# Patient Record
(demographics unavailable — no encounter records)

---

## 2025-04-03 NOTE — HEALTH RISK ASSESSMENT
[Excellent] : ~his/her~  mood as  excellent [No] : No [No falls in past year] : Patient reported no falls in the past year [Little interest or pleasure doing things] : 1) Little interest or pleasure doing things [Feeling down, depressed, or hopeless] : 2) Feeling down, depressed, or hopeless [0] : 2) Feeling down, depressed, or hopeless: Not at all (0) [PHQ-2 Negative - No further assessment needed] : PHQ-2 Negative - No further assessment needed [HIV test declined] : HIV test declined [Hepatitis C test declined] : Hepatitis C test declined [None] : None [With Family] : lives with family [Employed] : employed [Single] : single [Sexually Active] : sexually active [Feels Safe at Home] : Feels safe at home [Fully functional (bathing, dressing, toileting, transferring, walking, feeding)] : Fully functional (bathing, dressing, toileting, transferring, walking, feeding) [Fully functional (using the telephone, shopping, preparing meals, housekeeping, doing laundry, using] : Fully functional and needs no help or supervision to perform IADLs (using the telephone, shopping, preparing meals, housekeeping, doing laundry, using transportation, managing medications and managing finances) [Smoke Detector] : smoke detector [Carbon Monoxide Detector] : carbon monoxide detector [Safety elements used in home] : safety elements used in home [Seat Belt] :  uses seat belt [Sunscreen] : uses sunscreen [Never] : Never [de-identified] : none [de-identified] : none [de-identified] : active [de-identified] : good [facility] : 5 [Change in mental status noted] : No change in mental status noted [Language] : denies difficulty with language [High Risk Behavior] : no high risk behavior [Reports changes in hearing] : Reports no changes in hearing [Reports changes in vision] : Reports no changes in vision [Reports changes in dental health] : Reports no changes in dental health [TB Exposure] : is not being exposed to tuberculosis

## 2025-04-03 NOTE — PHYSICAL EXAM
[Normal] : normal gait, coordination grossly intact, no focal deficits and deep tendon reflexes were 2+ and symmetric [de-identified] : Left elbow lateral epicondyles tender on palpation, no deformity noted no joint swelling.

## 2025-04-03 NOTE — ASSESSMENT
[Vaccines Reviewed] : Immunizations reviewed today. Please see immunization details in the vaccine log within the immunization flowsheet.  [FreeTextEntry1] :    Annual Physical:  Counseled on dietary modification, daily regular exercise.  Increase fiber/ plant-based diet and decrease intake of meats and animal-based diet.  To routinely use seat belts. Self-testicular and skin exams advised.  Immunizations discussed and counseled. Age-appropriate screening exams discussed.  Suspect epicondylitis, advised to avoid repetitive movements, no lifting pushing or pulling, to check x-rays, NSAIDs as needed, follow-up in 2 weeks or sooner if needed.

## 2025-04-03 NOTE — PLAN
[FreeTextEntry1] : A/P:  Annual Physical:  Counseled on dietary modification, daily regular exercise.  Increase fiber/ plant-based diet and decrease intake of meats and animal-based diet.  To routinely use seat belts. Self-testicular and skin exams advised.  Immunizations discussed and counseled. Age-appropriate screening exams discussed.

## 2025-04-03 NOTE — HISTORY OF PRESENT ILLNESS
[FreeTextEntry1] : Annual Physical [de-identified] : Here to establish care, annual physical  endorses approx 1 yr onset of left shoulder / elbow pain  works in [employer] - lifting boxes, pain is intermittent, sharp at times, nonradiating.  No loss of strength or sensation in the arm. no falls or trauma

## 2025-04-17 NOTE — ASSESSMENT
[FreeTextEntry1] :  A/P: Lateral epicondylitis, x-ray results discussed, unremarkable  advised to avoid repetitive movements, no lifting pushing or pulling, NSAIDs as needed, to consult orthopedic.  Hyperlipidemia: Advised on strict dietary modification, daily regular exercise, to recheck labs and reassess in 3 months.  Vitamin D deficiency: To continue with replacement as ordered weekly, will recheck in 3 to 4 months.  Follow-up 3 months.

## 2025-04-17 NOTE — PHYSICAL EXAM
[Normal] : no rash [de-identified] : Left elbow lateral epicondyles tender on palpation, no deformity noted no joint swelling.

## 2025-04-17 NOTE — HISTORY OF PRESENT ILLNESS
[FreeTextEntry1] : f/u elbow pain / HLD [de-identified] : 31 y/o M with h/o HLD, Vit D def  endorses approx 1 yr onset of left shoulder / elbow pain  works in [employer] - lifting boxes, pain is intermittent, sharp at times, nonradiating.  No loss of strength or sensation in the arm. no falls or trauma - had xrays - negative  HLD - is trying to watch his diet  here to discuss labs

## 2025-04-24 NOTE — PHYSICAL EXAM
[de-identified] : Examination of the [left] elbow reveals tenderness at the level of the medial epicondyle. There is pain with resisted wrist flexion at the elbow. [de-identified] : 3 views of [left] elbow were obtained today in my office and were seen by me and discussed with the patient.  These are [grossly unremarkable]

## 2025-04-24 NOTE — HISTORY OF PRESENT ILLNESS
[FreeTextEntry1] : Sukhdeep is a 30-year-old male who presents today with chronic exacerbated atraumatic left medial sided elbow discomfort.  Symptoms are bothersome and affecting his ADLs.  He states that this is not a work-related injury.

## 2025-04-24 NOTE — ASSESSMENT
[FreeTextEntry1] : ASSESSMENT: The patient comes in today with chronic exacerbated atraumatic left medial sided elbow discomfort.  Symptoms are bothersome and affecting his ADLs.  Symptoms today are consistent with left elbow medial epicondylitis i.e. golfers elbow.  Treatment modalities were discussed, the patient elects for an injection.  We have also discussed activity modifications and HEP for his condition.    The patient was adequately and thoroughly informed of my assessment of their current condition(s).  - This may diminish bodily function for the extremity.  We discussed prognosis, treatment modalities including operative and nonoperative options for the above diagnostic assessment. As always, 2nd opinion is always provided as an option. For this, when accessible, I was able to review other physicians note(s) including reviewing other tests, imaging results as well as personally view these results for my own interpretation.      Injection Procedure: [Left elbow medial epicondyle tendon subsheath] The risks and benefits of a steroid injection were discussed in detail. The risks include but are not limited to: pain, infection, swelling, flare response, bleeding, subcutaneous fat atrophy, skin depigmentation and/or elevation of blood sugar. The risk of incomplete resolution of symptoms, recurrence and additional intervention was reviewed and considered by the patient.  The patient agreed to proceed and under a sterile prep, I injected 1 unit (6mg) into 1 cc of a combination of Celestone and Lidocaine into the above stated location for the procedure. The patient tolerated the injection well.   The patient was adequately and thoroughly informed of my assessment of their current condition(s).   DISCUSSION: 1.  Injection as above.  Activity modifications.  HEP. 2.  He works for [employer]. Follow-up in 2 months. 3. [x]

## 2025-06-26 NOTE — HISTORY OF PRESENT ILLNESS
[FreeTextEntry1] : Sukhdeep is a 30-year-old male who presents today with chronic exacerbated atraumatic left medial sided elbow discomfort.  Symptoms are bothersome and affecting his ADLs.  He states that this is not a work-related injury.  Describes relief with prior injection, however some symptoms persist.

## 2025-06-26 NOTE — PHYSICAL EXAM
[de-identified] : Examination of the [left] elbow reveals tenderness at the level of the medial epicondyle. There is pain with resisted wrist flexion at the elbow. [de-identified] : 3 views of [left] elbow were reviewed today in my office and were seen by me and discussed with the patient.  These are [grossly unremarkable]

## 2025-06-26 NOTE — ASSESSMENT
[FreeTextEntry1] : ASSESSMENT: The patient comes in today with chronic exacerbated atraumatic left medial sided elbow discomfort.  He describes relief with prior injection, however some symptoms have returned.  Symptoms today are consistent with left elbow medial epicondylitis i.e. golfers elbow.  Treatment modalities were discussed, the patient elects for a repeat injection as prior injection was helpful.  We have also discussed activity modifications and HEP for his condition.    The patient was adequately and thoroughly informed of my assessment of their current condition(s).  - This may diminish bodily function for the extremity.  We discussed prognosis, treatment modalities including operative and nonoperative options for the above diagnostic assessment. As always, 2nd opinion is always provided as an option. For this, when accessible, I was able to review other physicians note(s) including reviewing other tests, imaging results as well as personally view these results for my own interpretation.      Injection Procedure: [Left elbow medial epicondyle tendon subsheath] The risks and benefits of a steroid injection were discussed in detail. The risks include but are not limited to: pain, infection, swelling, flare response, bleeding, subcutaneous fat atrophy, skin depigmentation and/or elevation of blood sugar. The risk of incomplete resolution of symptoms, recurrence and additional intervention was reviewed and considered by the patient.  The patient agreed to proceed and under a sterile prep, I injected 1 unit (6mg) into 1 cc of a combination of Celestone and Lidocaine into the above stated location for the procedure. The patient tolerated the injection well.   The patient was adequately and thoroughly informed of my assessment of their current condition(s).   DISCUSSION: 1.  Injection as above.  Activity modifications.  HEP.  Positive response. 2.  He works for [employer]. Follow-up in 3 months. 3. [x]